# Patient Record
Sex: MALE | Race: WHITE | Employment: UNEMPLOYED | URBAN - METROPOLITAN AREA
[De-identification: names, ages, dates, MRNs, and addresses within clinical notes are randomized per-mention and may not be internally consistent; named-entity substitution may affect disease eponyms.]

---
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
## 2021-08-17 NOTE — TELEPHONE ENCOUNTER
----- Message from Olman Stein MD sent at 2/15/2018  1:04 PM EST -----  He continues to have elevated sed rate. Please call and see how he is doing. Is he still having diarrhea? I am leaning towards switching to a biologic because at this point, he should be completely asymptomatic with normal labs considering steroids that he is on along with azathioprine. Please let me know. Detail Level: Detailed Lesion Type: Abscess I&D: complicated Method: 11 blade Curette: Yes Include Sutures?: No Anesthesia Type: 1% lidocaine with epinephrine Anesthesia Volume In Cc: 6 Packing?: iodoform packing strips Size Of Lesion In Cm (Optional But May Be Required For Some Insurances): 0 Drainage Type?: bloody and cyst-like Wound Care: Petrolatum Dressing: pressure dressing Lab Facility: 3 Curette Text (Optional): After the contents were expressed a curette was used to partially remove the cyst wall. Histology Text: Following the procedure a portion of the material was sent for histologic evaluation. Biopsy Type: H and E Epidermal Sutures: 4-0 Ethilon Epidermal Closure: simple interrupted Suture Text: The incision was partially closed with Consent was obtained and risks were reviewed including but not limited to delayed wound healing, infection, need for multiple I and D's, and pain. Post-Care Instructions: I reviewed with the patient in detail post-care instructions. Patient should keep wound covered and call the office should any redness, pain, swelling or worsening occur. Billing Type: Third-Party Bill

## (undated) DEVICE — GLOVE ORANGE PI 7 1/2   MSG9075

## (undated) DEVICE — 3M™ STERI-STRIP™ REINFORCED ADHESIVE SKIN CLOSURES, R1547, 1/2 IN X 4 IN (12 MM X 100 MM), 6 STRIPS/ENVELOPE: Brand: 3M™ STERI-STRIP™

## (undated) DEVICE — TOWEL SURG W16XL26IN WHT NONFENESTRATED ST 4 PER PK

## (undated) DEVICE — INSUFFLATION TUBING SET WITH FILTER, FUNNEL CONNECTOR AND LUER LOCK: Brand: JOSNOE MEDICAL INC

## (undated) DEVICE — SUTURE VCRL SZ 3-0 L18IN ABSRB UD L26MM SH 1/2 CIR J864D

## (undated) DEVICE — SUTURE VCRL SZ 3-0 L27IN ABSRB UD L17MM RB-1 1/2 CIR J215H

## (undated) DEVICE — CAUTERY: TIP CLEANER XR 100/CS: Brand: MEDICAL ACTION INDUSTRIES

## (undated) DEVICE — Z DISCONTINUED NO SUB IDED DRAIN PENROSE L12IN 0.25IN USED TO PROMOTE DRNAGE IN OPN

## (undated) DEVICE — Device: Brand: DISPOSABLE BIOPSY FORCEPS

## (undated) DEVICE — BLADE ES ELASTOMERIC COAT INSUL DURABLE BEND UPTO 90DEG

## (undated) DEVICE — GOWN,AURORA,NONREINFORCED,LARGE: Brand: MEDLINE

## (undated) DEVICE — CHLORAPREP 26ML ORANGE

## (undated) DEVICE — GLOVE ORANGE PI 8 1/2   MSG9085

## (undated) DEVICE — SUTURE VCRL SZ 2-0 L27IN ABSRB VLT L26MM SH 1/2 CIR J317H

## (undated) DEVICE — 3M™ IOBAN™ 2 ANTIMICROBIAL INCISE DRAPE 6650EZ: Brand: IOBAN™ 2

## (undated) DEVICE — ELECTRODE PT RET AD L9FT HI MOIST COND ADH HYDRGEL CORDED

## (undated) DEVICE — Z DUP USE 2257490 ADHESIVE SKIN CLSRE 036ML TPCL 2CTL CNCRLTE HIGH VSCSTY DRMB

## (undated) DEVICE — SINGLE-USE BIOPSY FORCEPS: Brand: RADIAL JAW 4

## (undated) DEVICE — FORCEPS BX L240CM WRK CHN 2.8MM STD CAP W/ NDL MIC MESH

## (undated) DEVICE — FORCEP REPROC BIOP HOT 2.8MM MIN WORK CHANL RADL JAW 4

## (undated) DEVICE — GLOVE SURG SZ 6 THK91MIL LTX FREE SYN POLYISOPRENE ANTI

## (undated) DEVICE — INTENDED TO BE USED TO OCCLUDE, RETRACT AND IDENTIFY ARTERIES, VEINS, TENDONS AND NERVES IN SURGICAL PROCEDURES: Brand: STERION®  VESSEL LOOP

## (undated) DEVICE — GLOVE ORANGE PI 7   MSG9070

## (undated) DEVICE — GLOVE SURG SZ 65 THK91MIL LTX FREE SYN POLYISOPRENE

## (undated) DEVICE — SVMMC PEDS/UROLOGY MINOR PACK: Brand: MEDLINE INDUSTRIES, INC.